# Patient Record
(demographics unavailable — no encounter records)

---

## 2025-03-28 NOTE — DISCUSSION/SUMMARY
[FreeTextEntry1] : Sheela is a 56 yo P4 who presents with GREG (MIKEY>UUI)  MIKEY - We reviewed management options for stress urinary incontinence including: observation, pelvic floor exercises, continence devices, periurethral bulking agents, and surgical management. We discussed surgical management options and written information on stress urinary incontinence including management options from IUGA was provided to her and reviewed. We reviewed risks and benefits of each procedure. She is interested in surgery with a midurethral sling. We reviewed obtaining UDS prior to scheduling preoperative visit and surgery given mixed incontinence and she voiced understanding.   OAB/UUI - We discussed that her urge symptoms are most consistent with overactive bladder. We discussed first line therapies, including Kegel's exercises and behavioral modification. We discussed medications. We also discussed SNM, Botox, and PTNS. Side effects were discussed. IUGA patient information on overactive bladder and drug education was given to her. All questions were answered. She elects to treat MKIEY first with MUS and will continue to monitor OAB symptoms.   Pt to return for UDS with fellow then to be scheduled for preop visit with fellow on day that Dr. Blanton is in the office.

## 2025-03-28 NOTE — PROCEDURE
[Straight Catheterization] : insertion of a straight catheter [Stress Incontinence] : stress incontinence [Urgent Incontinence] : urgent incontinence [Patient] : the patient [Consent Obtained] : written consent was obtained prior to the procedure and is detailed in the patient's record [None] : there were no complications with the catheter insertion [No Complications] : no complications [Tolerated Well] : the patient tolerated the procedure well

## 2025-03-28 NOTE — PHYSICAL EXAM
[Chaperone Present] : A chaperone was present in the examining room during all aspects of the physical examination [No Acute Distress] : in no acute distress [Well developed] : well developed [Well Nourished] : ~L well nourished [Oriented x3] : oriented to person, place, and time [Respirations regular] : ~T respiratory rate was regular [Labia Majora] : were normal [Labia Minora] : were normal [Normal Appearance] : general appearance was normal [Normal] : no abnormalities [Post Void Residual ____ml] : post void residual was [unfilled] ml [FreeTextEntry2] : MD Elinor [FreeTextEntry3] : + projectile empty CST in lithotomy position [de-identified] : no prolapse

## 2025-03-28 NOTE — REASON FOR VISIT
[Initial Visit ___] : an initial visit for [unfilled] [Questionnaire Received] : Patient questionnaire received [Intake Form Reviewed] : Patient intake form with past medical history, surgical history, family history and social history reviewed today [Urinary Incontinence] : urinary incontinence [Urine Frequency] : urine frequency [Urinary Urgency] : urinary urgency [Language Line ] : provided by Language Line   [Interpreters_IDNumber] : [id number] [TWNoteComboBox1] : Angolan

## 2025-03-28 NOTE — HISTORY OF PRESENT ILLNESS
[Cystocele (Obstetric)] : no [Rectal Prolapse] : no [x2] : nocturia two times a night [] : yes [Uses ___ pads per day] : uses [unfilled] pad(s) per day [Urinary Frequency] : no [Urinary Tract Infection] : no [Hematuria] : no [Constipation Obstructed Defecation] : no [de-identified] : all the time  [de-identified] : occassionally [de-identified] : voids 8 times during the day [de-identified] : occassionally [FreeTextEntry1] : Sheela is a 58 yo P4 who presents for symptoms of GREG. She reports severe symptoms of stress incontinence where she leaks every time she laughs, coughs, sneezes. This has been present for many years. She also reports occasional urge incontinence which is bothersome, however the MIKEY is the most bothersome symptom. She denies symptoms of prolapse  PMH: R knee OA, lumbar radiculopathy, pre-DM PSH: gastric balloon, D&C hysteroscopy, BTL OB Hx: 4  Fluids: 1 cup coffee daily, water

## 2025-06-10 NOTE — PHYSICAL EXAM
[de-identified] : no prolapse [MA] : MA [Labia Majora] : were normal [Labia Minora] : were normal [Normal Appearance] : general appearance was normal [Atrophy] : atrophy [Aa ____] : Aa [unfilled] [Ba ____] : Ba [unfilled] [C ____] : C [unfilled] [Uterine Adnexae] : were not tender and not enlarged [Normal] : no abnormalities [FreeTextEntry2] : Karen

## 2025-06-10 NOTE — HISTORY OF PRESENT ILLNESS
[FreeTextEntry1] : 56 yo Mongolian-speaking F with GREG (S>U) here for pre-surgical counseling.  Patient denies any new complaints.  She continues to desire surgical management ().  Her pre-operative workup is as follows:  UDS (25): snf 300 mL, +-300 mL, no DO, PVR 0 cc. [x] UCx collected today [ ] Medical clearance   PMH: R knee OA, lumbar radiculopathy, pre-DM, obesity PSH: gastric balloon x 6 mo, D&C hysteroscopy, BTL OB Hx: 4 ; denies PMB Allergies: Macrobid Rx: ibuprofen, Meloxicam PRN Fam: denies coagulopathies  Soc: works as [employer] to 4 and 3 yo

## 2025-06-10 NOTE — DISCUSSION/SUMMARY
[FreeTextEntry1] : 58 yo Latvian-speaking F presents today to discuss surgical management of the stress urinary incontinence component of GREG. She desires a midurethral sling with mesh. She understands she has both stress and urgency incontinence and the procedure is not designed to treat the urgency aspect of her incontinence. We discussed that she has a small cystocele that may or may not be repaired at the time of surgery, depending on appearance under general anesthesia.    We reviewed risks to the procedure including, but not limited to: bleeding, infection, pain, urinary retention requiring an indwelling catheter, persistence or recurrence of stress incontinence or prolapse, failure of procedure to alleviate symptoms, development of overactive bladder or urge incontinence, voiding dysfunction, dyspareunia. We also extensively reviewed the risk of injury to the bladder, ureters, urethra, vagina, and bowel. We also discussed the risk of sling mesh exposure, fistula formation, neuropathy, erosion, pain, and need for reoperation. Risks were explained in layman's terms. She expressed understanding of these risks and continues to desire the planned surgical procedure. We discussed the possibility of going home with a catheter. We reviewed her hospital stay as well as preoperative and postoperative instructions. We provided a preoperative handout in English (daughter can read to her) detailing the surgery name, risks, and recovery instructions.   Patient signed consent for: pelvic exam under anesthesia, midurethral sling with mesh, cystoscopy, possible anterior repair. Pt understands that pelvic exam under anesthesia can be performed by learners (medical students/residents/fellows).  Patient verbalized understanding.  All questions answered.

## 2025-06-10 NOTE — HISTORY OF PRESENT ILLNESS
[FreeTextEntry1] : 58 yo Cameroonian-speaking F with GREG (S>U) here for pre-surgical counseling.  Patient denies any new complaints.  She continues to desire surgical management ().  Her pre-operative workup is as follows:  UDS (25): custodial 300 mL, +-300 mL, no DO, PVR 0 cc. [x] UCx collected today [ ] Medical clearance   PMH: R knee OA, lumbar radiculopathy, pre-DM, obesity PSH: gastric balloon x 6 mo, D&C hysteroscopy, BTL OB Hx: 4 ; denies PMB Allergies: Macrobid Rx: ibuprofen, Meloxicam PRN Fam: denies coagulopathies  Soc: works as [email or web address] to 4 and 3 yo

## 2025-06-10 NOTE — PHYSICAL EXAM
[de-identified] : no prolapse [MA] : MA [Labia Majora] : were normal [Labia Minora] : were normal [Normal Appearance] : general appearance was normal [Atrophy] : atrophy [Aa ____] : Aa [unfilled] [Ba ____] : Ba [unfilled] [C ____] : C [unfilled] [Uterine Adnexae] : were not tender and not enlarged [Normal] : no abnormalities [FreeTextEntry2] : Karen

## 2025-06-10 NOTE — DISCUSSION/SUMMARY
[FreeTextEntry1] : 58 yo Nigerian-speaking F presents today to discuss surgical management of the stress urinary incontinence component of GREG. She desires a midurethral sling with mesh. She understands she has both stress and urgency incontinence and the procedure is not designed to treat the urgency aspect of her incontinence. We discussed that she has a small cystocele that may or may not be repaired at the time of surgery, depending on appearance under general anesthesia.    We reviewed risks to the procedure including, but not limited to: bleeding, infection, pain, urinary retention requiring an indwelling catheter, persistence or recurrence of stress incontinence or prolapse, failure of procedure to alleviate symptoms, development of overactive bladder or urge incontinence, voiding dysfunction, dyspareunia. We also extensively reviewed the risk of injury to the bladder, ureters, urethra, vagina, and bowel. We also discussed the risk of sling mesh exposure, fistula formation, neuropathy, erosion, pain, and need for reoperation. Risks were explained in layman's terms. She expressed understanding of these risks and continues to desire the planned surgical procedure. We discussed the possibility of going home with a catheter. We reviewed her hospital stay as well as preoperative and postoperative instructions. We provided a preoperative handout in English (daughter can read to her) detailing the surgery name, risks, and recovery instructions.   Patient signed consent for: pelvic exam under anesthesia, midurethral sling with mesh, cystoscopy, possible anterior repair. Pt understands that pelvic exam under anesthesia can be performed by learners (medical students/residents/fellows).  Patient verbalized understanding.  All questions answered.

## 2025-06-10 NOTE — REASON FOR VISIT
[Follow-Up Visit_____] : a follow-up visit for [unfilled] [Ad Hoc ] : provided by an ad hoc  [TWNoteComboBox1] : Israeli

## 2025-06-10 NOTE — REASON FOR VISIT
[Follow-Up Visit_____] : a follow-up visit for [unfilled] [Ad Hoc ] : provided by an ad hoc  [TWNoteComboBox1] : Puerto [place]ican

## 2025-06-10 NOTE — END OF VISIT
[] : Fellow [FreeTextEntry3] : I was present and have fully participated in the care of this patient. I have reviewed all relevant clinical information, including history, exam, assessment, and plan. I agree with the above note with edits made where appropriate.

## 2025-07-22 NOTE — DISCUSSION/SUMMARY
[FreeTextEntry1] : CAREN is a 57 year who is s/p midurethral sling, cystoscopy on 7/1/25, doing well.  #Postop State - Reviewed postoperative instructions and restrictions - Pt doing well; understands she needs to call for any issues or present to ER for any acute changes - Udip positive for small blood. UA and UCX sent, will follow up with results. Low suspicion for UTI. Advised to hydrate well. Can take OTC Azo or Uristat as needed.  - Can take Tylenol or Ibuprofen as needed.  - Avoid anything inside vagina including tampon and sexual intercourse for 6 weeks post-op.  Avoid lifting anything heavier than 10 pounds for 6 weeks post-op.  Continue to avoid constipation using miralax/colace PRN.   RTO in 4 weeks.  All questions answered to pt satisfaction.  Pt knows to call the office for any questions or concerns.

## 2025-07-22 NOTE — END OF VISIT
[FreeTextEntry3] : I was present and have fully participated in the care of this patient. I have reviewed all relevant clinical information, including history, exam, assessment, and plan. I agree with the above note with edits made where appropriate.

## 2025-07-22 NOTE — SUBJECTIVE
[FreeTextEntry1] : Overall, doing well. [FreeTextEntry8] : None. [FreeTextEntry7] : None. [FreeTextEntry6] : Tolerating diet without nausea/vomiting. [FreeTextEntry5] : Reports dysuria since Saturday that has improved with cranberry juice. She also has intermittent back soreness/discomfort since her surgery. She denies urinary frequency, urgency, hematuria, fever, or chills. No MIKEY. Feels like she is fully emptying her bladder.  [FreeTextEntry4] : Intermittent constipation. Taking MiraLAX prn.  [FreeTextEntry3] : Tolerating without lightheadedness/dizziness.  [FreeTextEntry9] : Denies vaginal bleeding. No abnormal vaginal discharge.

## 2025-07-22 NOTE — OBJECTIVE
[Post Void Residual ____ ml] : Post Void Residual was [unfilled] ml [Soft and Nontender] : soft and nontender [Clean, Dry, Intact] : Clean, Dry, Intact [Good Support] : Good support [Healing well] : healing well [No Masses or Tenderness] : no masses or tenderness [FreeTextEntry3] : Sutures intact. No evidence of mesh exposure. Patient catheterized with 14 F catheter.